# Patient Record
Sex: FEMALE
[De-identification: names, ages, dates, MRNs, and addresses within clinical notes are randomized per-mention and may not be internally consistent; named-entity substitution may affect disease eponyms.]

---

## 2021-08-03 ENCOUNTER — TELEPHONE (OUTPATIENT)
Dept: SCHEDULING | Age: 41
End: 2021-08-03

## 2021-08-06 ENCOUNTER — APPOINTMENT (OUTPATIENT)
Dept: INTERNAL MEDICINE | Age: 41
End: 2021-08-06

## 2025-01-06 ENCOUNTER — HOSPITAL ENCOUNTER (OUTPATIENT)
Age: 45
Discharge: HOME | End: 2025-01-06
Payer: MEDICAID

## 2025-01-06 VITALS
SYSTOLIC BLOOD PRESSURE: 122 MMHG | HEART RATE: 105 BPM | DIASTOLIC BLOOD PRESSURE: 77 MMHG | OXYGEN SATURATION: 98 % | RESPIRATION RATE: 20 BRPM | TEMPERATURE: 99 F

## 2025-01-06 DIAGNOSIS — K52.9 GASTROENTERITIS: Primary | ICD-10-CM

## 2025-01-06 DIAGNOSIS — R10.9 ABDOMINAL PAIN, ACUTE: ICD-10-CM

## 2025-01-06 PROCEDURE — S0119 ONDANSETRON 4 MG: HCPCS | Performed by: EMERGENCY MEDICINE

## 2025-01-06 PROCEDURE — 99213 OFFICE O/P EST LOW 20 MIN: CPT | Performed by: EMERGENCY MEDICINE

## 2025-01-06 RX ORDER — ONDANSETRON 4 MG/1
4 TABLET, ORALLY DISINTEGRATING ORAL EVERY 4 HOURS PRN
Qty: 10 TABLET | Refills: 0 | Status: SHIPPED | OUTPATIENT
Start: 2025-01-06 | End: 2025-01-13

## 2025-01-06 RX ORDER — ONDANSETRON 4 MG/1
8 TABLET, ORALLY DISINTEGRATING ORAL ONCE
Status: COMPLETED | OUTPATIENT
Start: 2025-01-06 | End: 2025-01-06

## 2025-01-06 RX ORDER — FAMOTIDINE 40 MG/1
TABLET, FILM COATED ORAL
Qty: 30 TABLET | Refills: 0 | Status: SHIPPED | OUTPATIENT
Start: 2025-01-06

## 2025-01-06 NOTE — ED PROVIDER NOTES
Patient Seen in: [facility]      History     Chief Complaint   Patient presents with    Nausea/Vomiting/Diarrhea     Stated Complaint: stomach issues, currently breast feeding, super loose stool, shaky hands    Subjective:   HPI    Elisha Gonzáles is a 44 year old female here for GI sx for one day. Charcoal at home yesterday with some relief.  Mild upper.  Mild upper abdominal pain that does not radiate.  Symptoms do not worsen with eating.  Currently breastfeeding 11-month-old at home.  No coffee-ground emesis, or black/tarry stools.  No suspicious food intake, no one at home with similar symptoms.  No breast pain, or tenderness reported.      Objective:     No pertinent past medical history.            No pertinent past surgical history.              No pertinent social history.            Review of Systems    Positive for stated complaint: stomach issues, currently breast feeding, super loose stool, shaky hands  Other systems are as noted in HPI.  Constitutional and vital signs reviewed.      All other systems reviewed and negative except as noted above.    Physical Exam     ED Triage Vitals [01/06/25 1052]   /77   Pulse 105   Resp 20   Temp 99.4 °F (37.4 °C)   Temp src Oral   SpO2 98 %   O2 Device None (Room air)       Current Vitals:   Vital Signs  BP: 122/77  Pulse: 105  Resp: 20  Temp: 99.4 °F (37.4 °C)  Temp src: Oral    Oxygen Therapy  SpO2: 98 %  O2 Device: None (Room air)        Physical Exam  Vitals and nursing note reviewed.   Constitutional:       General: She is not in acute distress.     Appearance: Normal appearance. She is not ill-appearing or toxic-appearing.   HENT:      Head: Normocephalic.      Right Ear: External ear normal.      Left Ear: External ear normal.      Nose: No congestion.      Mouth/Throat:      Mouth: Mucous membranes are moist.      Pharynx: No oropharyngeal exudate.   Eyes:      Conjunctiva/sclera: Conjunctivae normal.      Pupils: Pupils are equal,  round, and reactive to light.   Cardiovascular:      Rate and Rhythm: Normal rate.      Pulses: Normal pulses.      Comments: Repeat heart rate 80 bpm from previous 105 on arrival.  Strong and regular.  Pulmonary:      Effort: Pulmonary effort is normal.   Abdominal:      General: Bowel sounds are increased. There is no distension.      Palpations: Abdomen is soft.      Tenderness: There is abdominal tenderness in the epigastric area. There is no guarding. Negative signs include Don's sign and McBurney's sign.   Musculoskeletal:      Cervical back: Normal range of motion. No erythema or rigidity. No pain with movement, spinous process tenderness or muscular tenderness. Normal range of motion.   Lymphadenopathy:      Cervical: No cervical adenopathy.      Right cervical: No superficial, deep or posterior cervical adenopathy.     Left cervical: No superficial, deep or posterior cervical adenopathy.   Skin:     General: Skin is warm.      Capillary Refill: Capillary refill takes less than 2 seconds.      Findings: No lesion or rash.   Neurological:      General: No focal deficit present.      Mental Status: She is alert and oriented to person, place, and time.   Psychiatric:         Mood and Affect: Mood normal.         Behavior: Behavior normal.         Thought Content: Thought content normal.         Judgment: Judgment normal.             ED Course   Labs Reviewed - No data to display                MDM             Medical Decision Making  Differential diagnosis includes not limited to gastroenteritis, flu, COVID, appendicitis, gallbladder disease, or other intra-abdominal infection.     Treat for abdominal pain with gastroenteritis.  Negative Don sign, and McBurney's point.  Mild tenderness over Epigastric region likely related to vomiting.  No mass, shift, fluctuation, or induration to abdomen.  No rash.   All questions answered.  Reinforced ER precautions, and follow-up care symptoms or not better within the  next week.  Agreed to ODT Zofran at the urgent care.  Declined further workup, and medication.  Prescription sent to the pharmacy take as directed.  No acute distress, clear for discharge home.    Problems Addressed:  Abdominal pain, acute: acute illness or injury  Gastroenteritis: acute illness or injury    Amount and/or Complexity of Data Reviewed  External Data Reviewed: notes.    Risk  OTC drugs.  Prescription drug management.        Disposition and Plan     Clinical Impression:  1. Gastroenteritis    2. Abdominal pain, acute         Disposition:  Discharge  1/6/2025 11:18 am    Follow-up:  Simona Wise MD  [address] [place] [id number]  [phone]                Medications Prescribed:  Discharge Medication List as of 1/6/2025 11:22 AM        START taking these medications    Details   famotidine (PEPCID) 40 MG Oral Tab Take 40 mg every 24 hours for the next week, and then take as needed, Normal, Disp-30 tablet, R-0      ondansetron 4 MG Oral Tablet Dispersible Take 1 tablet (4 mg total) by mouth every 4 (four) hours as needed for Nausea (No food or drink for at least 20 minutes after this medication.)., Normal, Disp-10 tablet, R-0                 Supplementary Documentation:

## 2025-01-06 NOTE — DISCHARGE INSTRUCTIONS
Leczymy Ci? na wirusowe zapalenie ?o??dka i jelit.  Podali?my ci tu na piln? pomoc lek przeciw nudno?ciom Zofran.   Objawy te trwaj? od 24 do 72 godzin.  Powiniene? zacz?? czu? si? lepiej w ci?gu najbli?szych kilku dni, szczególnie przed weekendem.    Dbaj o nawodnienie podczas karmienia piersi?.  Wys?a?em Zofran i Pepcid do apteki.  Pepcid stosuje si? armida dziennie przez kolejne 7 dni, nast?pnie stosowa? wed?ug potrzeb.  Je?li objawy anu ust?pi? lub anu ust?pi? podczas stosowania leku Pepcid, nale?y skontaktowa? si? z lekarzem podstawowej opieki zdrowotnej.  S? one na ogó? bezpieczne podczas karmienia piersi?, szczególnie w przypadku 11-miesi?cznego dziecka.  Udaj si? na pogotowie, je?li pojawi? si? nowe, nasilaj?ce si? objawy ze zwi?kszonym bólem brzucha.    We are treating you for viral gastroenteritis.  We gave you antinausea medication Zofran here at the urgent care.   These symptoms last anywhere between 24-72 hours.  You should start feeling better over the next few days, especially by the weekend.    Stay hydrated with breast-feeding.  I sent Zofran, and Pepcid to the pharmacy.  Pepcid is once a day for the next 7 days, then use as needed.  If symptoms continue, or persist taking Pepcid please contact primary care provider.  These are generally safe with breast-feeding especially for your 11-month-old baby.  Go to the emergency room for any new worsening symptoms with increased abdominal pain.